# Patient Record
Sex: MALE | Race: WHITE | NOT HISPANIC OR LATINO | URBAN - METROPOLITAN AREA
[De-identification: names, ages, dates, MRNs, and addresses within clinical notes are randomized per-mention and may not be internally consistent; named-entity substitution may affect disease eponyms.]

---

## 2018-03-20 ENCOUNTER — EMERGENCY (EMERGENCY)
Facility: HOSPITAL | Age: 63
LOS: 1 days | Discharge: HOME | End: 2018-03-20
Attending: EMERGENCY MEDICINE
Payer: COMMERCIAL

## 2018-03-20 VITALS
RESPIRATION RATE: 18 BRPM | DIASTOLIC BLOOD PRESSURE: 78 MMHG | TEMPERATURE: 98 F | SYSTOLIC BLOOD PRESSURE: 120 MMHG | HEART RATE: 58 BPM | HEIGHT: 65 IN | OXYGEN SATURATION: 99 % | WEIGHT: 315 LBS

## 2018-03-20 PROBLEM — Z00.00 ENCOUNTER FOR PREVENTIVE HEALTH EXAMINATION: Status: ACTIVE | Noted: 2018-03-20

## 2018-03-20 LAB
ALBUMIN SERPL ELPH-MCNC: 3.4 G/DL — LOW (ref 3.5–5)
ALP SERPL-CCNC: 69 U/L — SIGNIFICANT CHANGE UP (ref 40–120)
ALT FLD-CCNC: 39 U/L DA — SIGNIFICANT CHANGE UP (ref 10–60)
ANION GAP SERPL CALC-SCNC: 9 MMOL/L — SIGNIFICANT CHANGE UP (ref 5–17)
APPEARANCE UR: CLEAR — SIGNIFICANT CHANGE UP
AST SERPL-CCNC: 35 U/L — SIGNIFICANT CHANGE UP (ref 10–40)
BASOPHILS # BLD AUTO: 0.1 K/UL — SIGNIFICANT CHANGE UP (ref 0–0.2)
BASOPHILS NFR BLD AUTO: 1.5 % — SIGNIFICANT CHANGE UP (ref 0–2)
BILIRUB SERPL-MCNC: 0.6 MG/DL — SIGNIFICANT CHANGE UP (ref 0.2–1.2)
BILIRUB UR-MCNC: NEGATIVE — SIGNIFICANT CHANGE UP
BUN SERPL-MCNC: 22 MG/DL — HIGH (ref 7–18)
CALCIUM SERPL-MCNC: 9.1 MG/DL — SIGNIFICANT CHANGE UP (ref 8.4–10.5)
CHLORIDE SERPL-SCNC: 105 MMOL/L — SIGNIFICANT CHANGE UP (ref 96–108)
CO2 SERPL-SCNC: 27 MMOL/L — SIGNIFICANT CHANGE UP (ref 22–31)
COLOR SPEC: YELLOW — SIGNIFICANT CHANGE UP
CREAT SERPL-MCNC: 1.23 MG/DL — SIGNIFICANT CHANGE UP (ref 0.5–1.3)
DIFF PNL FLD: ABNORMAL
EOSINOPHIL # BLD AUTO: 0.2 K/UL — SIGNIFICANT CHANGE UP (ref 0–0.5)
EOSINOPHIL NFR BLD AUTO: 1.8 % — SIGNIFICANT CHANGE UP (ref 0–6)
GLUCOSE SERPL-MCNC: 178 MG/DL — HIGH (ref 70–99)
GLUCOSE UR QL: NEGATIVE — SIGNIFICANT CHANGE UP
HCT VFR BLD CALC: 50.4 % — HIGH (ref 39–50)
HGB BLD-MCNC: 16.2 G/DL — SIGNIFICANT CHANGE UP (ref 13–17)
KETONES UR-MCNC: NEGATIVE — SIGNIFICANT CHANGE UP
LEUKOCYTE ESTERASE UR-ACNC: ABNORMAL
LYMPHOCYTES # BLD AUTO: 1.7 K/UL — SIGNIFICANT CHANGE UP (ref 1–3.3)
LYMPHOCYTES # BLD AUTO: 19.9 % — SIGNIFICANT CHANGE UP (ref 13–44)
MCHC RBC-ENTMCNC: 28.7 PG — SIGNIFICANT CHANGE UP (ref 27–34)
MCHC RBC-ENTMCNC: 32.1 GM/DL — SIGNIFICANT CHANGE UP (ref 32–36)
MCV RBC AUTO: 89.4 FL — SIGNIFICANT CHANGE UP (ref 80–100)
MONOCYTES # BLD AUTO: 0.8 K/UL — SIGNIFICANT CHANGE UP (ref 0–0.9)
MONOCYTES NFR BLD AUTO: 8.8 % — SIGNIFICANT CHANGE UP (ref 2–14)
NEUTROPHILS # BLD AUTO: 6 K/UL — SIGNIFICANT CHANGE UP (ref 1.8–7.4)
NEUTROPHILS NFR BLD AUTO: 68 % — SIGNIFICANT CHANGE UP (ref 43–77)
NITRITE UR-MCNC: NEGATIVE — SIGNIFICANT CHANGE UP
PH UR: 5 — SIGNIFICANT CHANGE UP (ref 5–8)
PLATELET # BLD AUTO: 230 K/UL — SIGNIFICANT CHANGE UP (ref 150–400)
POTASSIUM SERPL-MCNC: 3.7 MMOL/L — SIGNIFICANT CHANGE UP (ref 3.5–5.3)
POTASSIUM SERPL-SCNC: 3.7 MMOL/L — SIGNIFICANT CHANGE UP (ref 3.5–5.3)
PROT SERPL-MCNC: 6.8 G/DL — SIGNIFICANT CHANGE UP (ref 6–8.3)
PROT UR-MCNC: 15
RBC # BLD: 5.64 M/UL — SIGNIFICANT CHANGE UP (ref 4.2–5.8)
RBC # FLD: 13.6 % — SIGNIFICANT CHANGE UP (ref 10.3–14.5)
SODIUM SERPL-SCNC: 141 MMOL/L — SIGNIFICANT CHANGE UP (ref 135–145)
SP GR SPEC: 1.03 — HIGH (ref 1.01–1.02)
UROBILINOGEN FLD QL: NEGATIVE — SIGNIFICANT CHANGE UP
WBC # BLD: 8.8 K/UL — SIGNIFICANT CHANGE UP (ref 3.8–10.5)
WBC # FLD AUTO: 8.8 K/UL — SIGNIFICANT CHANGE UP (ref 3.8–10.5)

## 2018-03-20 PROCEDURE — 99283 EMERGENCY DEPT VISIT LOW MDM: CPT | Mod: 25

## 2018-03-20 PROCEDURE — 85027 COMPLETE CBC AUTOMATED: CPT

## 2018-03-20 PROCEDURE — 99284 EMERGENCY DEPT VISIT MOD MDM: CPT

## 2018-03-20 PROCEDURE — 80053 COMPREHEN METABOLIC PANEL: CPT

## 2018-03-20 PROCEDURE — 93005 ELECTROCARDIOGRAM TRACING: CPT

## 2018-03-20 PROCEDURE — 81001 URINALYSIS AUTO W/SCOPE: CPT

## 2018-03-20 PROCEDURE — 82962 GLUCOSE BLOOD TEST: CPT

## 2018-03-20 RX ORDER — SODIUM CHLORIDE 9 MG/ML
1000 INJECTION INTRAMUSCULAR; INTRAVENOUS; SUBCUTANEOUS ONCE
Qty: 0 | Refills: 0 | Status: COMPLETED | OUTPATIENT
Start: 2018-03-20 | End: 2018-03-20

## 2018-03-20 RX ADMIN — SODIUM CHLORIDE 9999 MILLILITER(S): 9 INJECTION INTRAMUSCULAR; INTRAVENOUS; SUBCUTANEOUS at 04:55

## 2018-03-20 NOTE — ED PROVIDER NOTE - MEDICAL DECISION MAKING DETAILS
Pt feels better after IVF, no abdominal pain, no anxiety, no suicidal ideation.   Pt is well appearing walking with normal gait, stable for discharge and follow up with medical doctor. Pt educated on care and need for follow up. Discussed anticipatory guidance and return precautions. Questions answered. I had a detailed discussion with the patient and/or guardian regarding the historical points, exam findings, and any diagnostic results supporting the discharge diagnosis.

## 2018-03-20 NOTE — ED PROVIDER NOTE - PMH
BPH (Benign Prostatic Hyperplasia)    Diabetes mellitus    Diverticulosis  s/p diverticulitis  Fatty liver    GERD (gastroesophageal reflux disease)    Glaucoma    Hiatal hernia    Hypothyroid    ROBERT (obstructive sleep apnea)    Sleep apnea    Sleep apnea    Umbilical hernia    Varicose vein

## 2018-03-20 NOTE — ED ADULT NURSE NOTE - OBJECTIVE STATEMENT
Patient complain of constipation initially, then had large soft bowel movement, complains of feeling anxiety.

## 2018-03-20 NOTE — ED ADULT NURSE NOTE - PMH
BPH (Benign Prostatic Hyperplasia)    Diabetes mellitus    Diverticulosis  s/p diverticulitis  Fatty liver    GERD (gastroesophageal reflux disease)    Glaucoma    Hiatal hernia    Hypothyroid    ROBERT (obstructive sleep apnea)    Umbilical hernia    Varicose vein BPH (Benign Prostatic Hyperplasia)    Diabetes mellitus    Diverticulosis  s/p diverticulitis  Fatty liver    GERD (gastroesophageal reflux disease)    Glaucoma    Hiatal hernia    Hypothyroid    ROBERT (obstructive sleep apnea)    Sleep apnea    Sleep apnea    Umbilical hernia    Varicose vein

## 2018-03-20 NOTE — ED PROVIDER NOTE - OBJECTIVE STATEMENT
CC of 2 episodes of diarrhea starting at 11pm. No fever, no vomiting, no abdominal pain, no blood per rectum. Pt states currently taking Flagyl for past week, prescribed by his GI (Dr. Barber) for hemorrhoid. CC of 2 episodes of diarrhea starting at 11pm. No fever, no vomiting, no abdominal pain, no blood per rectum. Pt states currently taking Flagyl for past week, prescribed by his GI (Dr. Barber) for hemorrhoid.  Denies recent outside [place] travels, sick contacts or known spoiled food consumption.

## 2018-06-12 NOTE — ED PROVIDER NOTE - TEMPLATE
Pt from cath lab, VSS. 100% Paced on monitor.  R groin area dressing CDI, R Foot CMS intact, no bleeding noted. R Groin is soft. L chest dressing has scant red drainage. L arm on sling. . L arm CMS intact. Pt denies any pain.   Pt on bed rest until 2100. Will continue to monitor. Discussed POC w/ pt, verbalized understanding   Abdominal Pain, N/V/D

## 2018-12-07 ENCOUNTER — EMERGENCY (EMERGENCY)
Facility: HOSPITAL | Age: 63
LOS: 1 days | Discharge: HOME | End: 2018-12-07
Attending: EMERGENCY MEDICINE
Payer: COMMERCIAL

## 2018-12-07 PROCEDURE — 99283 EMERGENCY DEPT VISIT LOW MDM: CPT

## 2018-12-08 VITALS
TEMPERATURE: 98 F | HEIGHT: 65 IN | HEART RATE: 92 BPM | OXYGEN SATURATION: 98 % | WEIGHT: 250 LBS | DIASTOLIC BLOOD PRESSURE: 88 MMHG | RESPIRATION RATE: 16 BRPM | SYSTOLIC BLOOD PRESSURE: 168 MMHG

## 2018-12-08 PROBLEM — G47.30 SLEEP APNEA, UNSPECIFIED: Chronic | Status: ACTIVE | Noted: 2018-03-20

## 2018-12-08 PROCEDURE — 72170 X-RAY EXAM OF PELVIS: CPT

## 2018-12-08 PROCEDURE — 72125 CT NECK SPINE W/O DYE: CPT

## 2018-12-08 PROCEDURE — 99284 EMERGENCY DEPT VISIT MOD MDM: CPT | Mod: 25

## 2018-12-08 PROCEDURE — 72125 CT NECK SPINE W/O DYE: CPT | Mod: 26

## 2018-12-08 PROCEDURE — 71046 X-RAY EXAM CHEST 2 VIEWS: CPT | Mod: 26

## 2018-12-08 PROCEDURE — 70450 CT HEAD/BRAIN W/O DYE: CPT

## 2018-12-08 PROCEDURE — 70450 CT HEAD/BRAIN W/O DYE: CPT | Mod: 26

## 2018-12-08 PROCEDURE — 71046 X-RAY EXAM CHEST 2 VIEWS: CPT

## 2018-12-08 PROCEDURE — 72170 X-RAY EXAM OF PELVIS: CPT | Mod: 26

## 2018-12-08 RX ORDER — ACETAMINOPHEN 500 MG
650 TABLET ORAL ONCE
Qty: 0 | Refills: 0 | Status: COMPLETED | OUTPATIENT
Start: 2018-12-08 | End: 2018-12-08

## 2018-12-08 RX ADMIN — Medication 650 MILLIGRAM(S): at 01:27

## 2018-12-08 NOTE — ED PROVIDER NOTE - PHYSICAL EXAMINATION
GENERAL: wells appearing, no acute distress   HEAD: atraumatic   EYES: EOMI, pink conjunctiva   ENT: moist oral mucosa   CARDIAC: RRR, no edema, distal pulses present   RESPIRATORY: lungs CTAB, no increased work of breathing   GASTROINTESTINAL: no abdominal tenderness, no rebound or guarding, bowel sounds presents  GENITOURINARY: no CVA tenderness   MUSCULOSKELETAL: no deformity; chest wall and pelvis stable   NEUROLOGICAL: AAOx3, CN's II-XII intact, strength 5/5 bilateral UE and LE, sensation intact to light touch, finger to nose intact, steady gait  SKIN: intact   PSYCHIATRIC: cooperative  HEME LYMPH: no lymphadenopathy
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
## 2018-12-08 NOTE — ED PROVIDER NOTE - PROGRESS NOTE DETAILS
xray chest - no fractures, no pneumothorax   pelvic xray - no fractures, no dislocations   CT head - no hemorrhage or fracture  CT - no fractures  Pain controlled. Repeat neuro exam wnl. Will d/c with PCP fu. Discussed indications for patient return to ED. Patient understood.

## 2018-12-08 NOTE — ED PROVIDER NOTE - MEDICAL DECISION MAKING DETAILS
62 y/o presents s/p fall. Will order CT head and c-spine, CXR, X-ray of pelvis, give pain meds, and reassess.

## 2020-09-25 ENCOUNTER — APPOINTMENT (OUTPATIENT)
Dept: COLORECTAL SURGERY | Facility: CLINIC | Age: 65
End: 2020-09-25
Payer: COMMERCIAL

## 2020-09-25 ENCOUNTER — APPOINTMENT (OUTPATIENT)
Dept: COLORECTAL SURGERY | Facility: CLINIC | Age: 65
End: 2020-09-25

## 2020-09-25 VITALS — WEIGHT: 270.25 LBS | TEMPERATURE: 97 F | OXYGEN SATURATION: 69 %

## 2020-09-25 DIAGNOSIS — R10.84 GENERALIZED ABDOMINAL PAIN: ICD-10-CM

## 2020-09-25 DIAGNOSIS — K42.9 UMBILICAL HERNIA W/OUT OBSTRUCTION OR GANGRENE: ICD-10-CM

## 2020-09-25 DIAGNOSIS — R80.9 DIABETES MELLITUS DUE TO UNDERLYING CONDITION WITH OTHER DIABETIC KIDNEY COMPLICATION: ICD-10-CM

## 2020-09-25 DIAGNOSIS — E08.29 DIABETES MELLITUS DUE TO UNDERLYING CONDITION WITH OTHER DIABETIC KIDNEY COMPLICATION: ICD-10-CM

## 2020-09-25 DIAGNOSIS — Z79.4 DIABETES MELLITUS DUE TO UNDERLYING CONDITION WITH OTHER DIABETIC KIDNEY COMPLICATION: ICD-10-CM

## 2020-09-25 DIAGNOSIS — Z86.39 PERSONAL HISTORY OF OTHER ENDOCRINE, NUTRITIONAL AND METABOLIC DISEASE: ICD-10-CM

## 2020-09-25 DIAGNOSIS — Z87.19 PERSONAL HISTORY OF OTHER DISEASES OF THE DIGESTIVE SYSTEM: ICD-10-CM

## 2020-09-25 DIAGNOSIS — Z87.891 PERSONAL HISTORY OF NICOTINE DEPENDENCE: ICD-10-CM

## 2020-09-25 PROCEDURE — 99203 OFFICE O/P NEW LOW 30 MIN: CPT

## 2020-09-25 RX ORDER — THYROID, PORCINE 90 MG/1
TABLET ORAL
Refills: 0 | Status: ACTIVE | COMMUNITY

## 2020-09-25 RX ORDER — GLIPIZIDE 2.5 MG/1
TABLET ORAL
Refills: 0 | Status: ACTIVE | COMMUNITY

## 2020-09-25 RX ORDER — SITAGLIPTIN 100 MG/1
TABLET, FILM COATED ORAL
Refills: 0 | Status: ACTIVE | COMMUNITY

## 2020-09-25 RX ORDER — POLYETHYLENE GLYCOL 3350, SODIUM CHLORIDE, SODIUM BICARBONATE AND POTASSIUM CHLORIDE WITH LEMON FLAVOR 420; 11.2; 5.72; 1.48 G/4L; G/4L; G/4L; G/4L
420 POWDER, FOR SOLUTION ORAL
Qty: 1 | Refills: 0 | Status: ACTIVE | COMMUNITY
Start: 2020-09-25 | End: 1900-01-01

## 2020-09-25 RX ORDER — NALTREXONE HYDROCHLORIDE 50 MG/1
TABLET, FILM COATED ORAL
Refills: 0 | Status: ACTIVE | COMMUNITY

## 2020-09-25 RX ORDER — METFORMIN HYDROCHLORIDE 500 MG/1
500 TABLET, COATED ORAL
Refills: 0 | Status: ACTIVE | COMMUNITY

## 2020-09-25 RX ORDER — ATORVASTATIN CALCIUM 80 MG/1
TABLET, FILM COATED ORAL
Refills: 0 | Status: ACTIVE | COMMUNITY

## 2020-09-25 RX ORDER — OMEPRAZOLE 40 MG/1
40 CAPSULE, DELAYED RELEASE ORAL
Refills: 0 | Status: ACTIVE | COMMUNITY

## 2020-09-25 RX ORDER — EMPAGLIFLOZIN 25 MG/1
TABLET, FILM COATED ORAL
Refills: 0 | Status: ACTIVE | COMMUNITY

## 2020-09-25 NOTE — HISTORY OF PRESENT ILLNESS
[FreeTextEntry1] : 65 year old male here for pre colonoscopy visit.  Past hisotory of sigmoid colon resection for diverticulitis in 2017.  Bowel movements are erratic.  Also has complaints umbilical hernia. Last colonosopy was 2017 and at that time an adenoma was found. \par \par No rectal bleeding.   C/o abdominal cramps and GERD symptoms. Constipation at times.\par \par \par Patient was seen for a cardiac pain back i July,  underwent  a cardiologist and also had a stress test. \par \par Is obese.  Has lost 20 lbs recently on purpose.  \par \par PSH: sigmoid colon resection\par thyroglossal duct excision 1985\par Lipoma resection\par \par PMH: DM (on meds)\par Sleep apnea (uses CPAP)\par \par NKDA\par \par metformin\par glipizide\par januvia\par atorvastatin\par thyroid pills\par ompeprazole

## 2020-09-25 NOTE — PHYSICAL EXAM
[Exam Deferred] : exam was deferred [Normal Breath Sounds] : Normal breath sounds [Normal Heart Sounds] : normal heart sounds [1+] : left 1+ [No Rash or Lesion] : No rash or lesion [Alert] : alert [Oriented to Person] : oriented to person [Oriented to Place] : oriented to place [Oriented to Time] : oriented to time [Calm] : calm [Abdomen Masses] : No abdominal masses [Abdomen Tenderness] : ~T No ~M abdominal tenderness [JVD] : no jugular venous distention  [Thyroid] : the thyroid was abnormal [Carotid Bruits] : no carotid bruits [de-identified] : obese, protuberant umbilical and mid abdominal large hernia (> 15 cm in diameter), soft, incisions healed [de-identified] : NAD

## 2020-09-25 NOTE — PHYSICAL EXAM
[Exam Deferred] : exam was deferred [Normal Breath Sounds] : Normal breath sounds [Normal Heart Sounds] : normal heart sounds [1+] : left 1+ [No Rash or Lesion] : No rash or lesion [Alert] : alert [Oriented to Person] : oriented to person [Oriented to Place] : oriented to place [Oriented to Time] : oriented to time [Calm] : calm [Abdomen Masses] : No abdominal masses [Abdomen Tenderness] : ~T No ~M abdominal tenderness [JVD] : no jugular venous distention  [Thyroid] : the thyroid was abnormal [Carotid Bruits] : no carotid bruits [de-identified] : obese, protuberant umbilical and mid abdominal large hernia (> 15 cm in diameter), soft, incisions healed [de-identified] : NAD

## 2020-09-25 NOTE — ASSESSMENT
[FreeTextEntry1] : 65 year old male with history of colonic polyp, morbidly obese, \par needs surveilance colonoscopy. (hx adenoma)\par Will request medical clearance due to history of diabetes and cardiac work up. \par In poor condition.\par Urged to loose weight.\par Wants abdominal wall reconstruction for giant hernia.  Needs to loose weight prior, in my view.  \par

## 2020-10-23 ENCOUNTER — APPOINTMENT (OUTPATIENT)
Dept: DISASTER EMERGENCY | Facility: CLINIC | Age: 65
End: 2020-10-23

## 2020-10-23 DIAGNOSIS — Z01.818 ENCOUNTER FOR OTHER PREPROCEDURAL EXAMINATION: ICD-10-CM

## 2020-10-24 LAB — SARS-COV-2 N GENE NPH QL NAA+PROBE: NOT DETECTED

## 2020-10-28 ENCOUNTER — APPOINTMENT (OUTPATIENT)
Dept: COLORECTAL SURGERY | Facility: AMBULATORY SURGERY CENTER | Age: 65
End: 2020-10-28
Payer: COMMERCIAL

## 2020-10-28 PROCEDURE — 45380 COLONOSCOPY AND BIOPSY: CPT

## 2020-11-16 ENCOUNTER — APPOINTMENT (OUTPATIENT)
Dept: COLORECTAL SURGERY | Facility: CLINIC | Age: 65
End: 2020-11-16
Payer: COMMERCIAL

## 2020-11-16 VITALS
DIASTOLIC BLOOD PRESSURE: 65 MMHG | SYSTOLIC BLOOD PRESSURE: 99 MMHG | HEART RATE: 84 BPM | TEMPERATURE: 97.3 F | OXYGEN SATURATION: 97 % | BODY MASS INDEX: 46.85 KG/M2 | HEIGHT: 65 IN | WEIGHT: 281.19 LBS

## 2020-11-16 DIAGNOSIS — D12.5 BENIGN NEOPLASM OF SIGMOID COLON: ICD-10-CM

## 2020-11-16 PROCEDURE — 99214 OFFICE O/P EST MOD 30 MIN: CPT

## 2020-11-16 NOTE — HISTORY OF PRESENT ILLNESS
[FreeTextEntry1] : Patient here for discussion of his pathology and possible treatment options for the cecal and right colon adenomas and hyperplastic polyps (I suspect that they are serrated adenomas because of their at least 1.5-2 cm size).  EGD bx's showed non specific gastritis but no H pylori.  \par \par Patient doing well post recent colonoscopy.  BM's regular and without blood or abdominal pain.  \par \par Patient had colonoscopy 10/29/20 that revealed several adenomas and ? serrated adenomas.  No dysplasia noted and no cancer.  Mr. Mcgarry's colonoscopy was a difficult one and the maueverability of the scope once in the R colon was somewhat limited.\par \par I do not think there is any cancer now.  The treatment options were discussed and reviewed: 1) periodic colonoscopies and attempted Rx with conventional Scope methods (EMR), 2) Attempt at ESD/EMR in OR with back up to do laparoscopy to check for injuries and to repair the wall (if polyps successfully removed) or to do R hemicolectomy (if lesions not removable), 3) observation alone (rejected).  \par \par The finding of 3 probable sessile adenomas in the cecum and right colon (and the milly-IC valve location of 1 of them [far side of]) suggests that this segment of bowel is high risk for polyps and this the fact that there are 3 will make it more difficult to fully remove all lesions endoscopically  (3 advanced polypectomies in this area).  If we attempt ESD/EMR then we have to be ready to do laparoscopic right cricket at that time.\par Agreed to rediscuss in 3 months as to what our goals are and which setting we should utilize.\par \par This patient is high risk due to his obesity, prior abdominal surgery, and DM.  I suggested that the patient make every effort to loose weight (diet, exercise, consideration of Morbid obesity surgery [not interested]) over the next 3 months after which he will return to the office and we will reassess his risk for surgery and make decision on which of above apporached we pursue.   Stressed importance of weight loss to lower his risk profile and facilitate surgery. \par \par

## 2020-11-16 NOTE — ASSESSMENT
[FreeTextEntry1] : I am reluctant to do R cricket in the absence of at least HGD due to high surgical risk profile.  q 6 month colonoscopies with heavy biopsying (close surveilance) is a reasonable approach, especially if the patient is actively losing weight.  \par \par Showed the patient the ESD drawings and slides and explained that method in some detail. \par \par Given information about right hemicolectomy (which was reviewed in detail) and advanced polypectomy from website.  \par \par Answered all questions.\par \par Revisit here in 3 months and repeat colonoscopy with bx and conventional EMR alone vs OR ESD/EMR attempt and immediate R cricket if not doable via scope (minus injury).

## 2020-11-16 NOTE — PHYSICAL EXAM
[Abdomen Masses] : No abdominal masses [Abdomen Tenderness] : ~T No ~M abdominal tenderness [de-identified] : benign, obese, incisional hernia as before

## 2020-11-27 ENCOUNTER — OUTPATIENT (OUTPATIENT)
Dept: OUTPATIENT SERVICES | Facility: HOSPITAL | Age: 65
LOS: 1 days | End: 2020-11-27
Payer: COMMERCIAL

## 2020-11-27 ENCOUNTER — RESULT REVIEW (OUTPATIENT)
Age: 65
End: 2020-11-27

## 2020-11-27 DIAGNOSIS — D12.5 BENIGN NEOPLASM OF SIGMOID COLON: ICD-10-CM

## 2020-11-27 DIAGNOSIS — K63.5 POLYP OF COLON: ICD-10-CM

## 2020-11-27 PROCEDURE — 88321 CONSLTJ&REPRT SLD PREP ELSWR: CPT

## 2020-12-02 LAB — SURGICAL PATHOLOGY STUDY: SIGNIFICANT CHANGE UP

## 2020-12-28 ENCOUNTER — APPOINTMENT (OUTPATIENT)
Dept: COLORECTAL SURGERY | Facility: CLINIC | Age: 65
End: 2020-12-28
Payer: COMMERCIAL

## 2020-12-28 VITALS
SYSTOLIC BLOOD PRESSURE: 98 MMHG | OXYGEN SATURATION: 95 % | WEIGHT: 280 LBS | DIASTOLIC BLOOD PRESSURE: 63 MMHG | HEART RATE: 86 BPM | TEMPERATURE: 97.5 F | BODY MASS INDEX: 46.65 KG/M2 | HEIGHT: 65 IN

## 2020-12-28 DIAGNOSIS — K63.5 POLYP OF COLON: ICD-10-CM

## 2020-12-28 DIAGNOSIS — K64.5 PERIANAL VENOUS THROMBOSIS: ICD-10-CM

## 2020-12-28 PROCEDURE — 46600 DIAGNOSTIC ANOSCOPY SPX: CPT

## 2020-12-28 PROCEDURE — 99214 OFFICE O/P EST MOD 30 MIN: CPT | Mod: 25

## 2020-12-28 NOTE — ASSESSMENT
[FreeTextEntry1] : Had recent colonoscopy that revealed multiple adenomas and ? a sessile polyp in the cecal area.  Biopsies of this area showed hyperplastic polyp (my concern is for ? serrated adenoma).   The report is confusing because an EGD was done and the path from the 2 procedures were placed in the same container.\par morbidly obese as stated.  Elective surgery is last resort for a benign polyp. \par \par I propose that he try and loose weight and that we repeat the colonoscopy in 6 months at which time we will re-evaluate the 28 mm flat polyp I found that bx's showed was ? hyperplastic (? serrated adenoma).  Also, his prep was less than ideal.   Rationale explained and accepted. Patient is not anxious to have colectomy.  \par If we went to the OR for benign path would attempt  ESD/EMR for this lesion with colectomy as fall back procedure. \par \par Today also found to have thrombosed external hemorrhoid in left lateral position.  On way down.  Pain was worse 2 weeks ago.  Now no pain at rest.  Instructed patient to do sitz baths and avoid hard stools.  overlying skin intact and looks healthy.     \par \par Plan as per above.\par \par \par Total time spent with patient 25 minutes.  Exam plus discussion of situation and his findings and the treatment options.

## 2020-12-28 NOTE — PHYSICAL EXAM
[Skin Tags] : residual hemorrhoidal skin tags were noted [Normal] : was normal [Left Side] : on the left [None] : there was no rectal mass  [Abdomen Masses] : No abdominal masses [Abdomen Tenderness] : ~T No ~M abdominal tenderness [Excoriation] : no perianal excoriation [Fistula] : no fistulas [Wart] : no warts [Ulcer ___ cm] : no ulcers [Tender, Swollen] : nontender, non-swollen [Thrombosed] : that was not thrombosed [Stool Sample Taken] : no stool obtained on rectal exam [Gross Blood] : no gross blood [Fecal Impaction] : no fecal impaction was present [de-identified] : obese with large hernia (periumbilical hernia), non tender, no masses, multiple scars [de-identified] : thrombosed left lateral external hemorrhoid (2 x 2 cm, good sized) , not tensely distended (resolving) [de-identified] : digital: no masses beyond the thrombosed external hemorrhoid   [de-identified] : anoscopy: grade 1-2 hemorrhoids, no fissure [de-identified] : from hemorrhoid (mild)

## 2020-12-28 NOTE — HISTORY OF PRESENT ILLNESS
[FreeTextEntry1] : Had recent colonoscopy that revealed multiple adenomas and ? a sessile polyp in the cecal area.  Biopsies of this area showed hyperplastic polyp (? serrated adenoma).   The report is confusing because an EGD was done and the path from the 2 procedures were placed in the same container.\par \par Patient is morbidly obese and incredibly high risk.

## 2021-05-03 ENCOUNTER — APPOINTMENT (OUTPATIENT)
Dept: COLORECTAL SURGERY | Facility: CLINIC | Age: 66
End: 2021-05-03

## 2022-04-11 NOTE — ED PROVIDER NOTE - CROS ED MARK PERT SYS NEG
Pt feeling better. Reports less tongue swelling. Muffled voice improving.    jocelyn all pertinent systems negative

## 2025-03-25 NOTE — ED PROVIDER NOTE - CROS ED SKIN ALL NEG
You have stable vascular disease  Continue aspirin, statin  Return to clinic in one year  Continue daily walking  Call clinic for sooner appointment if you develop severe pain all the time in your foot or a non-healing wound  
negative...